# Patient Record
Sex: MALE | URBAN - METROPOLITAN AREA
[De-identification: names, ages, dates, MRNs, and addresses within clinical notes are randomized per-mention and may not be internally consistent; named-entity substitution may affect disease eponyms.]

---

## 2023-08-29 ENCOUNTER — APPOINTMENT (RX ONLY)
Dept: URBAN - METROPOLITAN AREA CLINIC 39 | Facility: CLINIC | Age: 68
Setting detail: DERMATOLOGY
End: 2023-08-29

## 2023-08-29 DIAGNOSIS — D485 NEOPLASM OF UNCERTAIN BEHAVIOR OF SKIN: ICD-10-CM

## 2023-08-29 PROBLEM — D48.5 NEOPLASM OF UNCERTAIN BEHAVIOR OF SKIN: Status: ACTIVE | Noted: 2023-08-29

## 2023-08-29 PROCEDURE — 40808 BIOPSY OF MOUTH LESION: CPT

## 2023-08-29 PROCEDURE — ? ORAL BIOPSY

## 2023-08-29 ASSESSMENT — LOCATION DETAILED DESCRIPTION DERM: LOCATION DETAILED: LEFT BUCCAL MUCOSA

## 2023-08-29 ASSESSMENT — LOCATION SIMPLE DESCRIPTION DERM: LOCATION SIMPLE: LEFT BUCCAL MUCOSA

## 2023-08-29 ASSESSMENT — LOCATION ZONE DERM: LOCATION ZONE: MUCOUS_MEMBRANE

## 2023-08-29 NOTE — PROCEDURE: ORAL BIOPSY
Detail Level: Simple
Oral Biopsy Type: Biopsy, vestibule of mouth, CPT 53644
Other Anesthesia: 0.5% lidocaine with epinephrine and 1:10 bicarbonate
Complications?: No
Lab: [id number]
Lab Facility: [id number]
Other Instruments: dermablade
Dissection: The minor salivary gland was dissected from the surrounding submucosa with a curved iris scissors.
Consent: The risks, benefits, complications, and alternatives of an oral biopsy were discussed in detail.  Risks and complications include but are not limited to: pain, infection, bleeding, lesion recurrence, false negative biopsy, need for more extensive surgery, numbness of the tongue or loss of taste, or garbled speech, among others.